# Patient Record
Sex: FEMALE | Race: WHITE | NOT HISPANIC OR LATINO | URBAN - METROPOLITAN AREA
[De-identification: names, ages, dates, MRNs, and addresses within clinical notes are randomized per-mention and may not be internally consistent; named-entity substitution may affect disease eponyms.]

---

## 2023-02-01 ENCOUNTER — OFFICE (OUTPATIENT)
Dept: URBAN - METROPOLITAN AREA CLINIC 72 | Facility: CLINIC | Age: 23
End: 2023-02-01
Payer: COMMERCIAL

## 2023-02-01 VITALS
DIASTOLIC BLOOD PRESSURE: 75 MMHG | WEIGHT: 182 LBS | OXYGEN SATURATION: 97 % | HEIGHT: 62 IN | HEART RATE: 110 BPM | SYSTOLIC BLOOD PRESSURE: 118 MMHG

## 2023-02-01 DIAGNOSIS — K64.9 UNSPECIFIED HEMORRHOIDS: ICD-10-CM

## 2023-02-01 DIAGNOSIS — K92.1 MELENA: ICD-10-CM

## 2023-02-01 DIAGNOSIS — R10.32 LEFT LOWER QUADRANT PAIN: ICD-10-CM

## 2023-02-01 DIAGNOSIS — K60.2 ANAL FISSURE, UNSPECIFIED: ICD-10-CM

## 2023-02-01 DIAGNOSIS — R19.4 CHANGE IN BOWEL HABIT: ICD-10-CM

## 2023-02-01 PROCEDURE — 99204 OFFICE O/P NEW MOD 45 MIN: CPT | Performed by: INTERNAL MEDICINE

## 2023-02-01 RX ORDER — HYOSCYAMINE SULFATE 0.12 MG/1
0.38 TABLET SUBLINGUAL
Qty: 30 | Refills: 3 | Status: ACTIVE
Start: 2023-02-01

## 2023-02-01 NOTE — SERVICEHPINOTES
Madai Weaver   is seen for an initial visit today.  
br
br   Symptoms started in the last year or so, she was having some mental issues.  It was more that her living situation was difficult br She developed a pain in the LLQ, she saw GYN.  She has had blood twice, bright red blood, she thinks in the water only not mixed in the stool.  She went to the ER and they did a CT.  
br
br Sometims it is pain, sometimes pressure, sometimes it comes in waves.  There is a change in her bowel movements as well.  Before she used to have solid healthy stool, now it is more soft consistency.  Sometimes there can be some straining and hard stool.  Sometimes there is an urgency feeling.  
Ana nurse has reviewed and updated the medication list with the patient. I have reviewed the medication list along with the documented medical, social and family history. Pertinent details are also noted above in the HPI.

## 2023-02-01 NOTE — SERVICENOTES
Our goal is to partner with you to improve your health and well being. It is important for you to complete necessary testing and follow the instructions given to you at your clinic visit. Our office will call you within 2 weeks with results of any testing but you may also call sooner to obtain results - [phone].   If you have any questions or concerns please feel free to call us.  We take your care very seriously and we thank you for your trust!
- , Start psyllium fiber (brand examples are Metamucil and konsyl).  Do not get the one with artificial sweeteners (ie don't get Metamucil Sugar Free Orange).  Start with 1/2 dose and increase to full dose as tolerated.  If this causes too much gas, then please let me know.,
-  Start a daily probiotics.  Try to get one that has lactobacillus gg and bifidobacter.  Natures bounty 10 or [facility] are both good ones
- submit stool studies, if they are positive then will plan for colonoscopy
- follow up with NP in [place] in 4-6 weeks

## 2023-03-13 ENCOUNTER — OFFICE (OUTPATIENT)
Dept: URBAN - METROPOLITAN AREA CLINIC 67 | Facility: CLINIC | Age: 23
End: 2023-03-13
Payer: COMMERCIAL

## 2023-03-13 VITALS
WEIGHT: 184 LBS | DIASTOLIC BLOOD PRESSURE: 80 MMHG | HEART RATE: 115 BPM | SYSTOLIC BLOOD PRESSURE: 124 MMHG | HEIGHT: 62 IN | OXYGEN SATURATION: 96 %

## 2023-03-13 DIAGNOSIS — R10.32 LEFT LOWER QUADRANT PAIN: ICD-10-CM

## 2023-03-13 DIAGNOSIS — R19.4 CHANGE IN BOWEL HABIT: ICD-10-CM

## 2023-03-13 PROCEDURE — 99213 OFFICE O/P EST LOW 20 MIN: CPT | Performed by: NURSE PRACTITIONER

## 2023-03-13 RX ORDER — HYOSCYAMINE SULFATE 0.12 MG/1
0.38 TABLET SUBLINGUAL
Qty: 30 | Refills: 3 | Status: ACTIVE
Start: 2023-02-01

## 2023-03-13 NOTE — SERVICEHPINOTES
Madai Weaver   is seen today for a follow-up visit. 
kiley 
br2/1/2023 First Visit with Dr. Swenson: Kayla Weaver is seen for an initial visit today. Symptoms started in the last year or so, she was having some mental issues. It was more that her living situation was difficult brShe developed a pain in the LLQ, she saw GYN. She has had blood twice, bright red blood, she thinks in the water only not mixed in the stool. She went to the ER and they did a CT. Sometimes it is pain, sometimes pressure, sometimes it comes in waves. There is a change in her bowel movements as well. Before she used to have solid healthy stool, now it is more soft consistency. Sometimes there can be some straining and hard stool. Sometimes there is an urgency feeling. Plan from 2/1/2023: brLLQ pain, change in bowel habits, BRBP in toilet bowel with small hemorrhoids and anal fissure on exam. I suspect this is an IBS flare with some fissure related to recent stress and living situation, but inflammatory condition is also possible. Less likely would be polyp given age and no family history. I will get fecal calprotectin, FOBT and if positive would recommend colonoscopy. I will start on fiber, probiotics and PRN levsin. Follow up in 4-6 weeks in [place] with NP (she lives right next ot that office. Today 3/13/2023brMs. Owen returns to the clinic for follow up. She is doing a little better with hyoscyamine. The lower abdominal pain is improved. She last took hyoscyamine last weekend. No abdominal pain since then. br
kiley Her stool is still very soft but easier to pass. No blood or mucus in stool. She will have occasional diarrhea, but relates that to eating. She had stopped all fast food.  
br
br She has been taking Metamucil and Probiotic daily. br
br She denies any nausea, vomiting, reflux, regurgitation, dysphagia, constipation, blood or mucus in stool, significant weight loss, change in appetite, fever, chills or fatigue.
br
kiley She brought back stool samples ordered by Dr. Swenson,  but we were closed. Labs: br1/23 CBC normal, CMP normal, UA normal Imaging: br1/2023 CT AP with contrast normal